# Patient Record
Sex: FEMALE | Race: WHITE | HISPANIC OR LATINO | URBAN - METROPOLITAN AREA
[De-identification: names, ages, dates, MRNs, and addresses within clinical notes are randomized per-mention and may not be internally consistent; named-entity substitution may affect disease eponyms.]

---

## 2022-04-19 ENCOUNTER — INPATIENT HOSPITAL (AMBULATORY)
Dept: URBAN - METROPOLITAN AREA HOSPITAL 76 | Facility: HOSPITAL | Age: 22
End: 2022-04-19
Payer: COMMERCIAL

## 2022-04-19 DIAGNOSIS — K22.3 PERFORATION OF ESOPHAGUS: ICD-10-CM

## 2022-04-19 DIAGNOSIS — R07.89 OTHER CHEST PAIN: ICD-10-CM

## 2022-04-19 DIAGNOSIS — R11.2 NAUSEA WITH VOMITING, UNSPECIFIED: ICD-10-CM

## 2022-04-19 PROCEDURE — 99223 1ST HOSP IP/OBS HIGH 75: CPT | Performed by: INTERNAL MEDICINE

## 2022-04-20 ENCOUNTER — INPATIENT HOSPITAL (AMBULATORY)
Dept: URBAN - METROPOLITAN AREA HOSPITAL 76 | Facility: HOSPITAL | Age: 22
End: 2022-04-20
Payer: COMMERCIAL

## 2022-04-20 DIAGNOSIS — R93.3 ABNORMAL FINDINGS ON DIAGNOSTIC IMAGING OF OTHER PARTS OF DI: ICD-10-CM

## 2022-04-20 DIAGNOSIS — D72.829 ELEVATED WHITE BLOOD CELL COUNT, UNSPECIFIED: ICD-10-CM

## 2022-04-20 DIAGNOSIS — K22.3 PERFORATION OF ESOPHAGUS: ICD-10-CM

## 2022-04-20 DIAGNOSIS — R11.2 NAUSEA WITH VOMITING, UNSPECIFIED: ICD-10-CM

## 2022-04-20 DIAGNOSIS — F12.90 CANNABIS USE, UNSPECIFIED, UNCOMPLICATED: ICD-10-CM

## 2022-04-20 PROCEDURE — 99232 SBSQ HOSP IP/OBS MODERATE 35: CPT | Performed by: NURSE PRACTITIONER

## 2022-04-21 PROCEDURE — 99232 SBSQ HOSP IP/OBS MODERATE 35: CPT | Performed by: NURSE PRACTITIONER

## 2022-04-22 ENCOUNTER — INPATIENT HOSPITAL (AMBULATORY)
Dept: URBAN - METROPOLITAN AREA HOSPITAL 76 | Facility: HOSPITAL | Age: 22
End: 2022-04-22
Payer: COMMERCIAL

## 2022-04-22 DIAGNOSIS — R93.3 ABNORMAL FINDINGS ON DIAGNOSTIC IMAGING OF OTHER PARTS OF DI: ICD-10-CM

## 2022-04-22 DIAGNOSIS — F12.90 CANNABIS USE, UNSPECIFIED, UNCOMPLICATED: ICD-10-CM

## 2022-04-22 DIAGNOSIS — K22.3 PERFORATION OF ESOPHAGUS: ICD-10-CM

## 2022-04-22 DIAGNOSIS — R11.2 NAUSEA WITH VOMITING, UNSPECIFIED: ICD-10-CM

## 2022-04-22 PROCEDURE — 99231 SBSQ HOSP IP/OBS SF/LOW 25: CPT | Performed by: NURSE PRACTITIONER

## 2022-06-10 ENCOUNTER — OFFICE (AMBULATORY)
Dept: URBAN - METROPOLITAN AREA CLINIC 64 | Facility: CLINIC | Age: 22
End: 2022-06-10
Payer: COMMERCIAL

## 2022-06-10 VITALS
WEIGHT: 121 LBS | DIASTOLIC BLOOD PRESSURE: 82 MMHG | SYSTOLIC BLOOD PRESSURE: 117 MMHG | HEIGHT: 62 IN | HEART RATE: 84 BPM

## 2022-06-10 DIAGNOSIS — R11.2 NAUSEA WITH VOMITING, UNSPECIFIED: ICD-10-CM

## 2022-06-10 PROCEDURE — 99214 OFFICE O/P EST MOD 30 MIN: CPT | Performed by: INTERNAL MEDICINE

## 2022-06-10 RX ORDER — FAMOTIDINE 40 MG/1
40 TABLET, FILM COATED ORAL
Qty: 30 | Refills: 3 | Status: ACTIVE
Start: 2022-06-10

## 2022-06-13 ENCOUNTER — OFFICE (AMBULATORY)
Dept: URBAN - METROPOLITAN AREA PATHOLOGY 4 | Facility: PATHOLOGY | Age: 22
End: 2022-06-13
Payer: COMMERCIAL

## 2022-06-13 ENCOUNTER — ON CAMPUS - OUTPATIENT (AMBULATORY)
Dept: URBAN - METROPOLITAN AREA HOSPITAL 2 | Facility: HOSPITAL | Age: 22
End: 2022-06-13
Payer: COMMERCIAL

## 2022-06-13 VITALS
SYSTOLIC BLOOD PRESSURE: 130 MMHG | HEART RATE: 98 BPM | HEART RATE: 99 BPM | HEART RATE: 97 BPM | SYSTOLIC BLOOD PRESSURE: 113 MMHG | DIASTOLIC BLOOD PRESSURE: 74 MMHG | OXYGEN SATURATION: 99 % | DIASTOLIC BLOOD PRESSURE: 69 MMHG | DIASTOLIC BLOOD PRESSURE: 68 MMHG | SYSTOLIC BLOOD PRESSURE: 106 MMHG | HEIGHT: 62 IN | WEIGHT: 121 LBS | HEART RATE: 104 BPM | SYSTOLIC BLOOD PRESSURE: 120 MMHG | DIASTOLIC BLOOD PRESSURE: 61 MMHG | OXYGEN SATURATION: 100 % | RESPIRATION RATE: 16 BRPM | HEART RATE: 88 BPM | OXYGEN SATURATION: 95 % | TEMPERATURE: 98.4 F | DIASTOLIC BLOOD PRESSURE: 89 MMHG | SYSTOLIC BLOOD PRESSURE: 100 MMHG

## 2022-06-13 DIAGNOSIS — K22.89 OTHER SPECIFIED DISEASE OF ESOPHAGUS: ICD-10-CM

## 2022-06-13 DIAGNOSIS — K21.00 GASTRO-ESOPHAGEAL REFLUX DISEASE WITH ESOPHAGITIS, WITHOUT B: ICD-10-CM

## 2022-06-13 DIAGNOSIS — R11.2 NAUSEA WITH VOMITING, UNSPECIFIED: ICD-10-CM

## 2022-06-13 DIAGNOSIS — K31.89 OTHER DISEASES OF STOMACH AND DUODENUM: ICD-10-CM

## 2022-06-13 DIAGNOSIS — R12 HEARTBURN: ICD-10-CM

## 2022-06-13 DIAGNOSIS — K44.9 DIAPHRAGMATIC HERNIA WITHOUT OBSTRUCTION OR GANGRENE: ICD-10-CM

## 2022-06-13 DIAGNOSIS — S11.21XA: ICD-10-CM

## 2022-06-13 DIAGNOSIS — R07.89 OTHER CHEST PAIN: ICD-10-CM

## 2022-06-13 LAB
GI HISTOLOGY: A. UNSPECIFIED: (no result)
GI HISTOLOGY: B. SELECT: (no result)
GI HISTOLOGY: C. UNSPECIFIED: (no result)
GI HISTOLOGY: D. SELECT: (no result)
GI HISTOLOGY: PDF REPORT: (no result)

## 2022-06-13 PROCEDURE — 43239 EGD BIOPSY SINGLE/MULTIPLE: CPT | Performed by: INTERNAL MEDICINE

## 2022-06-13 PROCEDURE — 88305 TISSUE EXAM BY PATHOLOGIST: CPT | Performed by: INTERNAL MEDICINE

## 2022-08-24 ENCOUNTER — OFFICE (AMBULATORY)
Dept: URBAN - METROPOLITAN AREA CLINIC 64 | Facility: CLINIC | Age: 22
End: 2022-08-24
Payer: COMMERCIAL

## 2022-08-24 VITALS
HEIGHT: 62 IN | HEART RATE: 86 BPM | SYSTOLIC BLOOD PRESSURE: 114 MMHG | DIASTOLIC BLOOD PRESSURE: 71 MMHG | WEIGHT: 134 LBS

## 2022-08-24 DIAGNOSIS — R11.2 NAUSEA WITH VOMITING, UNSPECIFIED: ICD-10-CM

## 2022-08-24 PROCEDURE — 99213 OFFICE O/P EST LOW 20 MIN: CPT | Performed by: INTERNAL MEDICINE

## 2023-09-16 ENCOUNTER — HOSPITAL ENCOUNTER (OUTPATIENT)
Facility: HOSPITAL | Age: 23
Discharge: HOME | End: 2023-09-16
Attending: EMERGENCY MEDICINE | Admitting: EMERGENCY MEDICINE
Payer: MEDICAID

## 2023-09-16 ENCOUNTER — APPOINTMENT (OUTPATIENT)
Dept: GENERAL RADIOLOGY | Facility: HOSPITAL | Age: 23
End: 2023-09-16
Payer: MEDICAID

## 2023-09-16 VITALS
HEART RATE: 81 BPM | HEIGHT: 63 IN | RESPIRATION RATE: 18 BRPM | TEMPERATURE: 98.7 F | DIASTOLIC BLOOD PRESSURE: 72 MMHG | SYSTOLIC BLOOD PRESSURE: 109 MMHG | WEIGHT: 130 LBS | BODY MASS INDEX: 23.04 KG/M2 | OXYGEN SATURATION: 98 %

## 2023-09-16 DIAGNOSIS — M54.9 MUSCULOSKELETAL BACK PAIN: Primary | ICD-10-CM

## 2023-09-16 LAB
B-HCG UR QL: NEGATIVE
BILIRUB UR QL STRIP: NEGATIVE
CLARITY UR: CLEAR
COLOR UR: YELLOW
GLUCOSE UR STRIP-MCNC: NEGATIVE MG/DL
HGB UR QL STRIP.AUTO: ABNORMAL
KETONES UR QL STRIP: ABNORMAL
LEUKOCYTE ESTERASE UR QL STRIP.AUTO: ABNORMAL
NITRITE UR QL STRIP: NEGATIVE
PH UR STRIP.AUTO: 5.5 [PH] (ref 5–8)
PROT UR QL STRIP: NEGATIVE
SP GR UR STRIP: >=1.03 (ref 1–1.03)
UROBILINOGEN UR QL STRIP: ABNORMAL

## 2023-09-16 PROCEDURE — 25010000002 KETOROLAC TROMETHAMINE PER 15 MG

## 2023-09-16 PROCEDURE — 81003 URINALYSIS AUTO W/O SCOPE: CPT

## 2023-09-16 PROCEDURE — 93005 ELECTROCARDIOGRAM TRACING: CPT

## 2023-09-16 PROCEDURE — G0463 HOSPITAL OUTPT CLINIC VISIT: HCPCS

## 2023-09-16 PROCEDURE — 71045 X-RAY EXAM CHEST 1 VIEW: CPT

## 2023-09-16 PROCEDURE — 81025 URINE PREGNANCY TEST: CPT

## 2023-09-16 RX ORDER — METHYLPREDNISOLONE 4 MG/1
TABLET ORAL
Qty: 21 TABLET | Refills: 0 | Status: SHIPPED | OUTPATIENT
Start: 2023-09-16

## 2023-09-16 RX ORDER — LIDOCAINE HYDROCHLORIDE 10 MG/ML
5 INJECTION, SOLUTION EPIDURAL; INFILTRATION; INTRACAUDAL; PERINEURAL ONCE
Status: DISCONTINUED | OUTPATIENT
Start: 2023-09-16 | End: 2023-09-16

## 2023-09-16 RX ORDER — KETOROLAC TROMETHAMINE 30 MG/ML
30 INJECTION, SOLUTION INTRAMUSCULAR; INTRAVENOUS ONCE
Status: COMPLETED | OUTPATIENT
Start: 2023-09-16 | End: 2023-09-16

## 2023-09-16 RX ADMIN — KETOROLAC TROMETHAMINE 30 MG: 30 INJECTION, SOLUTION INTRAMUSCULAR; INTRAVENOUS at 14:59

## 2023-09-16 NOTE — DISCHARGE INSTRUCTIONS
Take steroid pack as prescribed to completion.  Alternate Tylenol and ibuprofen as needed for pain.  Return to emergency department for worsening symptoms or other medical emergencies.  Recommended follow-up with PCP.  Refer to the attached instructions for further information.

## 2023-09-16 NOTE — FSED PROVIDER NOTE
Subjective   History of Present Illness  Patient 23-year-old female who presents emergency department for back pain.  Patient recently diagnosed with COVID x6 days ago.  Reports continued cough, congestion, body aches.  Went back to work today, and had sharp back pain in her mid upper back.  Worse with deep breath.  Denies shortness of breath, chest pain.  Currently taking antibiotics for BV.      Review of Systems   Constitutional:  Positive for fatigue. Negative for chills and fever.   HENT:  Positive for congestion and rhinorrhea. Negative for sinus pressure, sinus pain, sore throat and trouble swallowing.    Respiratory:  Positive for cough. Negative for shortness of breath and wheezing.    Cardiovascular:  Negative for chest pain.   Gastrointestinal:  Negative for abdominal pain.   Genitourinary:  Negative for decreased urine volume, difficulty urinating, dysuria, flank pain and frequency.   Musculoskeletal:  Positive for back pain.   Skin:  Negative for color change, pallor, rash and wound.     Past Medical History:   Diagnosis Date    Depression        No Known Allergies    History reviewed. No pertinent surgical history.    History reviewed. No pertinent family history.    Social History     Socioeconomic History    Marital status: Single   Tobacco Use    Smoking status: Never   Substance and Sexual Activity    Alcohol use: Never    Drug use: Yes     Types: Marijuana           Objective   Physical Exam  Constitutional:       General: She is not in acute distress.     Appearance: She is normal weight. She is ill-appearing. She is not toxic-appearing.      Comments: Patient lying down resting.   HENT:      Nose: Congestion present.      Mouth/Throat:      Mouth: Mucous membranes are moist.      Pharynx: Oropharynx is clear. No oropharyngeal exudate.   Cardiovascular:      Rate and Rhythm: Normal rate and regular rhythm.   Pulmonary:      Effort: Pulmonary effort is normal. No respiratory distress.      Breath  sounds: Normal breath sounds. No wheezing.   Abdominal:      General: Abdomen is flat. There is no distension.      Palpations: Abdomen is soft.      Tenderness: There is no abdominal tenderness. There is no right CVA tenderness, left CVA tenderness or guarding.   Musculoskeletal:         General: Normal range of motion.      Comments: Upper thoracic pain mildly tender to palpation bilaterally.   Skin:     General: Skin is warm and dry.   Neurological:      Mental Status: She is alert.   Psychiatric:         Mood and Affect: Mood normal.         Behavior: Behavior normal.       Procedures           ED Course  ED Course as of 09/16/23 1611   Sat Sep 16, 2023   1405 EKG normal sinus rhythm.  Early repolarization.  No evidence of ischemic changes. [AS]   1420 Negative chest x-ray. [AS]   1438 Leukocytes, UA(!): Trace [AS]   1438 Blood, UA(!): Large (3+) [AS]   1438 Ketones, UA(!): Trace [AS]   1454 HCG, Urine QL: Negative [AS]      ED Course User Index  [AS] Ruth Christopher, VALERIE                                           Medical Decision Making  Patient is a 23-year-old female who presents to emergency department for back pain onset to the day as well as continued upper respiratory symptoms from COVID-19 infection.  Patient out of quarantine today, and went to work before experiencing a sharp mid upper back pain.  Patient appears ill, but no acute distress.  Nontoxic.  Vital signs WNL.  Exam is largely unremarkable and nonconcerning.  Low to no suspicion for cardiopulmonary or kidney etiology based on history and exam.  EKG unremarkable.  Chest x-ray negative.  Urinalysis negative.  More likely musculoskeletal strain from frequent coughing and/or going back to work today.  Will prescribe Medrol Dosepak.  Discussed when to return to the emergency department.  Answered all questions.  Patient verbalized understanding and was agreeable to plan and discharge.    My differential diagnosis for back pain includes but is not  limited to: Strain, contusion, fracture, pneumonia, referred pain, pleurisy, myositis, costochondritis, MI, angina, aortic dissection, pericarditis, PE, pneumothorax    Amount and/or Complexity of Data Reviewed  Radiology: ordered.  ECG/medicine tests: ordered.    Risk  Prescription drug management.        Final diagnoses:   Musculoskeletal back pain       ED Disposition  ED Disposition       ED Disposition   Discharge    Condition   Stable    Comment   --               Christiano Jorgensen MD  [address]  [facility] [address]  [place] [address] [phone]    Schedule an appointment as soon as possible for a visit            Medication List        New Prescriptions      methylPREDNISolone 4 MG dose pack  Commonly known as: MEDROL  6 tabs PO x1 day, 5 tabs PO x1 day, and continue decrease of 1 tablet/day.  6 days total treatment.               Where to Get Your Medications        These medications were sent to [id number] - [place], [place] - [address] AT [address] [place] - [phone] [facility] [phone] [address]  [address] [place] [address]      Phone: [phone]   methylPREDNISolone 4 MG dose pack

## 2023-09-17 LAB
QT INTERVAL: 386 MS
QTC INTERVAL: 430 MS

## 2023-11-02 ENCOUNTER — APPOINTMENT (OUTPATIENT)
Dept: CT IMAGING | Facility: HOSPITAL | Age: 23
End: 2023-11-02
Payer: MEDICAID

## 2023-11-02 ENCOUNTER — HOSPITAL ENCOUNTER (EMERGENCY)
Facility: HOSPITAL | Age: 23
Discharge: HOME | End: 2023-11-02
Attending: STUDENT IN AN ORGANIZED HEALTH CARE EDUCATION/TRAINING PROGRAM
Payer: MEDICAID

## 2023-11-02 VITALS
OXYGEN SATURATION: 98 % | HEART RATE: 88 BPM | BODY MASS INDEX: 22.13 KG/M2 | WEIGHT: 124.9 LBS | DIASTOLIC BLOOD PRESSURE: 68 MMHG | HEIGHT: 63 IN | RESPIRATION RATE: 16 BRPM | TEMPERATURE: 98 F | SYSTOLIC BLOOD PRESSURE: 105 MMHG

## 2023-11-02 DIAGNOSIS — R10.31 RIGHT LOWER QUADRANT PAIN: Primary | ICD-10-CM

## 2023-11-02 LAB
ALBUMIN SERPL-MCNC: 4.7 G/DL (ref 3.5–5.2)
ALBUMIN/GLOB SERPL: 2.6 G/DL
ALP SERPL-CCNC: 45 U/L (ref 39–117)
ALT SERPL W P-5'-P-CCNC: 17 U/L (ref 1–33)
ANION GAP SERPL CALCULATED.3IONS-SCNC: 6.5 MMOL/L (ref 5–15)
AST SERPL-CCNC: 15 U/L (ref 1–32)
B-HCG UR QL: NEGATIVE
BACTERIA UR QL AUTO: ABNORMAL /HPF
BASOPHILS # BLD AUTO: 0.02 10*3/MM3 (ref 0–0.2)
BASOPHILS NFR BLD AUTO: 0.5 % (ref 0–1.5)
BILIRUB SERPL-MCNC: 0.4 MG/DL (ref 0–1.2)
BILIRUB UR QL STRIP: NEGATIVE
BUN SERPL-MCNC: 16 MG/DL (ref 6–20)
BUN/CREAT SERPL: 19.3 (ref 7–25)
CALCIUM SPEC-SCNC: 9.6 MG/DL (ref 8.6–10.5)
CHLORIDE SERPL-SCNC: 102 MMOL/L (ref 98–107)
CLARITY UR: ABNORMAL
CO2 SERPL-SCNC: 28.5 MMOL/L (ref 22–29)
COLOR UR: ABNORMAL
CREAT SERPL-MCNC: 0.83 MG/DL (ref 0.57–1)
DEPRECATED RDW RBC AUTO: 37.9 FL (ref 37–54)
EGFRCR SERPLBLD CKD-EPI 2021: 100.5 ML/MIN/1.73
EOSINOPHIL # BLD AUTO: 0.04 10*3/MM3 (ref 0–0.4)
EOSINOPHIL NFR BLD AUTO: 1 % (ref 0.3–6.2)
ERYTHROCYTE [DISTWIDTH] IN BLOOD BY AUTOMATED COUNT: 11.3 % (ref 12.3–15.4)
GLOBULIN UR ELPH-MCNC: 1.8 GM/DL
GLUCOSE SERPL-MCNC: 84 MG/DL (ref 65–99)
GLUCOSE UR STRIP-MCNC: NEGATIVE MG/DL
HCT VFR BLD AUTO: 40.8 % (ref 34–46.6)
HGB BLD-MCNC: 13.7 G/DL (ref 12–15.9)
HGB UR QL STRIP.AUTO: ABNORMAL
HYALINE CASTS UR QL AUTO: ABNORMAL /LPF
IMM GRANULOCYTES # BLD AUTO: 0 10*3/MM3 (ref 0–0.05)
IMM GRANULOCYTES NFR BLD AUTO: 0 % (ref 0–0.5)
KETONES UR QL STRIP: ABNORMAL
LEUKOCYTE ESTERASE UR QL STRIP.AUTO: NEGATIVE
LIPASE SERPL-CCNC: 16 U/L (ref 13–60)
LYMPHOCYTES # BLD AUTO: 1.05 10*3/MM3 (ref 0.7–3.1)
LYMPHOCYTES NFR BLD AUTO: 26.5 % (ref 19.6–45.3)
MCH RBC QN AUTO: 30.3 PG (ref 26.6–33)
MCHC RBC AUTO-ENTMCNC: 33.6 G/DL (ref 31.5–35.7)
MCV RBC AUTO: 90.3 FL (ref 79–97)
MONOCYTES # BLD AUTO: 0.23 10*3/MM3 (ref 0.1–0.9)
MONOCYTES NFR BLD AUTO: 5.8 % (ref 5–12)
NEUTROPHILS NFR BLD AUTO: 2.62 10*3/MM3 (ref 1.7–7)
NEUTROPHILS NFR BLD AUTO: 66.2 % (ref 42.7–76)
NITRITE UR QL STRIP: NEGATIVE
PH UR STRIP.AUTO: 5.5 [PH] (ref 5–8)
PLATELET # BLD AUTO: 286 10*3/MM3 (ref 140–450)
PMV BLD AUTO: 9.5 FL (ref 6–12)
POTASSIUM SERPL-SCNC: 3.9 MMOL/L (ref 3.5–5.2)
PROT SERPL-MCNC: 6.5 G/DL (ref 6–8.5)
PROT UR QL STRIP: ABNORMAL
RBC # BLD AUTO: 4.52 10*6/MM3 (ref 3.77–5.28)
RBC # UR STRIP: ABNORMAL /HPF
REF LAB TEST METHOD: ABNORMAL
SODIUM SERPL-SCNC: 137 MMOL/L (ref 136–145)
SP GR UR STRIP: >=1.03 (ref 1–1.03)
SQUAMOUS #/AREA URNS HPF: ABNORMAL /HPF
UROBILINOGEN UR QL STRIP: ABNORMAL
WBC # UR STRIP: ABNORMAL /HPF
WBC NRBC COR # BLD: 3.96 10*3/MM3 (ref 3.4–10.8)

## 2023-11-02 PROCEDURE — 83690 ASSAY OF LIPASE: CPT

## 2023-11-02 PROCEDURE — 80053 COMPREHEN METABOLIC PANEL: CPT

## 2023-11-02 PROCEDURE — 25510000001 IOPAMIDOL PER 1 ML: Performed by: STUDENT IN AN ORGANIZED HEALTH CARE EDUCATION/TRAINING PROGRAM

## 2023-11-02 PROCEDURE — 25810000003 SODIUM CHLORIDE 0.9 % SOLUTION

## 2023-11-02 PROCEDURE — 87086 URINE CULTURE/COLONY COUNT: CPT

## 2023-11-02 PROCEDURE — 81001 URINALYSIS AUTO W/SCOPE: CPT

## 2023-11-02 PROCEDURE — 85025 COMPLETE CBC W/AUTO DIFF WBC: CPT

## 2023-11-02 PROCEDURE — 25010000002 ONDANSETRON PER 1 MG

## 2023-11-02 PROCEDURE — 74177 CT ABD & PELVIS W/CONTRAST: CPT

## 2023-11-02 PROCEDURE — 96374 THER/PROPH/DIAG INJ IV PUSH: CPT

## 2023-11-02 PROCEDURE — 81025 URINE PREGNANCY TEST: CPT | Performed by: STUDENT IN AN ORGANIZED HEALTH CARE EDUCATION/TRAINING PROGRAM

## 2023-11-02 PROCEDURE — 99285 EMERGENCY DEPT VISIT HI MDM: CPT

## 2023-11-02 PROCEDURE — 99284 EMERGENCY DEPT VISIT MOD MDM: CPT

## 2023-11-02 PROCEDURE — 96361 HYDRATE IV INFUSION ADD-ON: CPT

## 2023-11-02 RX ORDER — ONDANSETRON 2 MG/ML
4 INJECTION INTRAMUSCULAR; INTRAVENOUS ONCE
Status: COMPLETED | OUTPATIENT
Start: 2023-11-02 | End: 2023-11-02

## 2023-11-02 RX ORDER — SODIUM CHLORIDE 0.9 % (FLUSH) 0.9 %
10 SYRINGE (ML) INJECTION AS NEEDED
Status: DISCONTINUED | OUTPATIENT
Start: 2023-11-02 | End: 2023-11-02 | Stop reason: HOSPADM

## 2023-11-02 RX ORDER — KETOROLAC TROMETHAMINE 15 MG/ML
15 INJECTION, SOLUTION INTRAMUSCULAR; INTRAVENOUS ONCE
Status: DISCONTINUED | OUTPATIENT
Start: 2023-11-02 | End: 2023-11-02

## 2023-11-02 RX ORDER — ONDANSETRON 4 MG/1
4 TABLET, ORALLY DISINTEGRATING ORAL EVERY 8 HOURS PRN
Qty: 12 TABLET | Refills: 0 | Status: SHIPPED | OUTPATIENT
Start: 2023-11-02

## 2023-11-02 RX ADMIN — SODIUM CHLORIDE 1000 ML: 9 INJECTION, SOLUTION INTRAVENOUS at 12:37

## 2023-11-02 RX ADMIN — ONDANSETRON 4 MG: 2 INJECTION INTRAMUSCULAR; INTRAVENOUS at 12:38

## 2023-11-02 RX ADMIN — IOPAMIDOL 100 ML: 755 INJECTION, SOLUTION INTRAVENOUS at 13:28

## 2023-11-02 NOTE — FSED PROVIDER NOTE
[facility]    EMERGENCY DEPARTMENT ENCOUNTER    Room Number:  04/04  Date seen:  11/2/2023  Time seen: 12:29 EDT  PCP: Christiano Jorgensen MD  Historian: Patient    HPI:  Chief complaint: Abdominal pain  Context:Jayne Lagunas is a 23 y.o. female who presents to the ED with c/o abdominal pain.  Patient reports that she has been having right lower quadrant abdominal pain for the last 3 days.  She also reports that she has been having some nausea but denies any vomiting or diarrhea.  Patient denies any radiation of the pain.  She does report that she has some pain with urination.  Patient denies any fever.  Reports she has been eating and drinking without difficulty.  She denies any history of surgeries on her abdomen.  She does report that she has a history of PCOS and was recently started on metformin a couple months ago.    Timing: Constant  Duration: 3 days  Location: Right lower quadrant  Radiation: Nonradiating  Quality: Aching  Intensity/Severity: Mild to moderate  Associated Symptoms: Right lower quadrant pain, nausea  Aggravating Factors: Palpation  Alleviating Factors: No known alleviating      MEDICAL RECORD REVIEW  Depression    ALLERGIES  Patient has no known allergies.    PAST MEDICAL HISTORY  Active Ambulatory Problems     Diagnosis Date Noted    No Active Ambulatory Problems     Resolved Ambulatory Problems     Diagnosis Date Noted    No Resolved Ambulatory Problems     Past Medical History:   Diagnosis Date    Depression        PAST SURGICAL HISTORY  History reviewed. No pertinent surgical history.    FAMILY HISTORY  History reviewed. No pertinent family history.    SOCIAL HISTORY  Social History     Socioeconomic History    Marital status: Single   Tobacco Use    Smoking status: Never   Substance and Sexual Activity    Alcohol use: Never    Drug use: Yes     Types: Marijuana       REVIEW OF SYSTEMS  Review of Systems    All systems reviewed and  negative except for those discussed in HPI.     PHYSICAL EXAM    I have reviewed the triage vital signs and nursing notes.    ED Triage Vitals [11/02/23 1138]   Temp Heart Rate Resp BP SpO2   97.7 °F (36.5 °C) 90 18 105/67 97 %      Temp src Heart Rate Source Patient Position BP Location FiO2 (%)   Oral Monitor Sitting Left arm --       Physical Exam  Constitutional:       Appearance: She is well-developed. She is not toxic-appearing.   HENT:      Mouth/Throat:      Mouth: Mucous membranes are moist.   Eyes:      Extraocular Movements: Extraocular movements intact.   Cardiovascular:      Rate and Rhythm: Normal rate and regular rhythm.      Heart sounds: Normal heart sounds.   Pulmonary:      Effort: Pulmonary effort is normal.      Breath sounds: Normal breath sounds.   Abdominal:      General: Abdomen is flat. Bowel sounds are normal. There is no distension.      Palpations: Abdomen is soft.      Tenderness: There is abdominal tenderness in the right lower quadrant. There is no guarding or rebound.   Skin:     General: Skin is warm.   Neurological:      General: No focal deficit present.      Mental Status: She is alert.   Psychiatric:         Mood and Affect: Mood normal.         Vital signs and nursing notes reviewed.        LAB RESULTS  Recent Results (from the past 24 hour(s))   Pregnancy, Urine - Urine, Clean Catch    Collection Time: 11/02/23 12:06 PM    Specimen: Urine, Clean Catch   Result Value Ref [place]    HCG, Urine QL Negative Negative   Urinalysis without microscopic (no culture) - Urine, Clean Catch    Collection Time: 11/02/23 12:08 PM    Specimen: Urine, Clean Catch   Result Value Ref [place]    Color, UA Dark Yellow (A) Yellow, Straw    Appearance, UA Cloudy (A) Clear    pH, UA 5.5 5.0 - 8.0    Specific Gravity, UA >=1.030 1.005 - 1.030    Glucose, UA Negative Negative    Ketones, UA Trace (A) Negative    Bilirubin, UA Negative Negative    Blood, UA Trace (A) Negative    Protein, UA 30 mg/dL (1+)  (A) Negative    Leuk Esterase, UA Negative Negative    Nitrite, UA Negative Negative    Urobilinogen, UA 0.2 E.U./dL 0.2 - 1.0 E.U./dL   Comprehensive Metabolic Panel    Collection Time: 11/02/23 12:37 PM    Specimen: Blood   Result Value Ref [place]    Glucose 84 65 - 99 mg/dL    BUN 16 6 - 20 mg/dL    Creatinine 0.83 0.57 - 1.00 mg/dL    Sodium 137 136 - 145 mmol/L    Potassium 3.9 3.5 - 5.2 mmol/L    Chloride 102 98 - 107 mmol/L    CO2 28.5 22.0 - 29.0 mmol/L    Calcium 9.6 8.6 - 10.5 mg/dL    Total Protein 6.5 6.0 - 8.5 g/dL    Albumin 4.7 3.5 - 5.2 g/dL    ALT (SGPT) 17 1 - 33 U/L    AST (SGOT) 15 1 - 32 U/L    Alkaline Phosphatase 45 39 - 117 U/L    Total Bilirubin 0.4 0.0 - 1.2 mg/dL    Globulin 1.8 gm/dL    A/G Ratio 2.6 g/dL    BUN/Creatinine Ratio 19.3 7.0 - 25.0    Anion Gap 6.5 5.0 - 15.0 mmol/L    eGFR 100.5 >60.0 mL/min/1.73   Lipase    Collection Time: 11/02/23 12:37 PM    Specimen: Blood   Result Value Ref [place]    Lipase 16 13 - 60 U/L   CBC Auto Differential    Collection Time: 11/02/23 12:37 PM    Specimen: Blood   Result Value Ref [place]    WBC 3.96 3.40 - 10.80 10*3/mm3    RBC 4.52 3.77 - 5.28 10*6/mm3    Hemoglobin 13.7 12.0 - 15.9 g/dL    Hematocrit 40.8 34.0 - 46.6 %    MCV 90.3 79.0 - 97.0 fL    MCH 30.3 26.6 - 33.0 pg    MCHC 33.6 31.5 - 35.7 g/dL    RDW 11.3 (L) 12.3 - 15.4 %    RDW-SD 37.9 37.0 - 54.0 fl    MPV 9.5 6.0 - 12.0 fL    Platelets 286 140 - 450 10*3/mm3    Neutrophil % 66.2 42.7 - 76.0 %    Lymphocyte % 26.5 19.6 - 45.3 %    Monocyte % 5.8 5.0 - 12.0 %    Eosinophil % 1.0 0.3 - 6.2 %    Basophil % 0.5 0.0 - 1.5 %    Immature Grans % 0.0 0.0 - 0.5 %    Neutrophils, Absolute 2.62 1.70 - 7.00 10*3/mm3    Lymphocytes, Absolute 1.05 0.70 - 3.10 10*3/mm3    Monocytes, Absolute 0.23 0.10 - 0.90 10*3/mm3    Eosinophils, Absolute 0.04 0.00 - 0.40 10*3/mm3    Basophils, Absolute 0.02 0.00 - 0.20 10*3/mm3    Immature Grans, Absolute 0.00 0.00 - 0.05 10*3/mm3       Ordered the above labs and  independently reviewed the results.      RADIOLOGY RESULTS  CT Abdomen Pelvis With Contrast    Result Date: 11/2/2023  CT ABDOMEN PELVIS W CONTRAST Date of Exam: 11/2/2023 1:20 PM EDT Indication: RLQ pain, nausea. Comparison: None available. Technique: Axial CT images were obtained of the abdomen and pelvis following the uneventful intravenous administration of iodinated contrast. Sagittal and coronal reconstructions were performed.  Automated exposure control and iterative reconstruction methods were used. Findings: Lung bases are without consolidation. Tiny hiatal hernia. No pericardial or pleural effusion. The liver and spleen are normal in size and contour. No radiodense gallstone. Normal adrenal glands. Pancreas without findings of pancreatitis. No biliary dilatation. Kidneys are symmetrically enhancing. No hydronephrosis or hydroureter. No perinephric fluid collection. No ureteral calculus. Urinary bladder is without acute abnormality. Uterus is retroverted. The ovaries are enlarged with peripherally oriented follicles most consistent with polycystic ovarian syndrome. No suspicious adnexal mass. No drainable fluid collection. Negative for pneumoperitoneum. No bowel obstruction. Normal appendix. Moderate amount of stool throughout the colon. Stomach and proximal small bowel normally configured. The portal vein, splenic vein, and superior mesenteric vein are patent. Abdominal aorta and branch vasculature patent. Variant anatomy with duplicated infrarenal IVC. No aggressive osseous lesion or acute fracture.     Impression: 1. No acute abnormality in the abdomen or pelvis. 2. Normal appendix. 3. Enlarged ovaries with numerous peripheral follicles most consistent with polycystic ovarian syndrome. Electronically Signed: Sigifredo Waterman MD  11/2/2023 1:43 PM EDT  Workstation ID: [id number]        I ordered the above noted radiological studies. Independently reviewed by me and discussed with radiologist.  See dictation  above for official radiology interpretation.      Orders placed during this visit:  Orders Placed This Encounter   Procedures    CT Abdomen Pelvis With Contrast    Pregnancy, Urine - Urine, Clean Catch    Urinalysis without microscopic (no culture) - Urine, Clean Catch    Comprehensive Metabolic Panel    Lipase    CBC Auto Differential    Urinalysis With Culture If Indicated - Urine, Clean Catch    Urinalysis, Microscopic Only - Urine, Clean Catch    NPO Diet NPO Type: Strict NPO    Undress & Gown    Insert Peripheral IV    CBC & Differential    ED Acknowledgement Form Needed;           PROCEDURES    Procedures        MEDICATIONS GIVEN IN ER    Medications   sodium chloride 0.9 % flush 10 mL (has no administration in time range)   ketorolac (TORADOL) injection 15 mg (has no administration in time range)   sodium chloride 0.9 % bolus 1,000 mL (0 mL Intravenous Stopped 11/2/23 1351)   ondansetron (ZOFRAN) injection 4 mg (4 mg Intravenous Given 11/2/23 1238)   iopamidol (ISOVUE-370) 76 % injection 100 mL (100 mL Intravenous Given 11/2/23 1328)         PROGRESS, DATA ANALYSIS, CONSULTS, AND MEDICAL DECISION MAKING    All labs have been independently reviewed by me.  All radiology studies have been reviewed by me.   EKG's independently reviewed by me.  Discussion below represents my analysis of pertinent findings related to patient's condition, differential diagnosis, treatment plan and final disposition.    I rechecked the patient.  I discussed the patient's labs, radiology findings (including all incidental findings), diagnosis, and plan for discharge.  A repeat exam reveals no new worrisome changes from my initial exam findings.  The patient understands that the fact that they are being discharged does not denote that nothing is abnormal, it indicates that no clinical emergency is present and that they must follow-up as directed in order to properly maintain their health.  Follow-up instructions (specifically listed  below) and return to ER precautions were given at this time.  I specifically instructed the patient to follow-up with their PCP.  The patient understands and agrees with the plan, and is ready for discharge.  All questions answered.    ED Course as of 11/02/23 1421   Thu Nov 02, 2023   1409 CT Abdomen Pelvis With Contrast  Impression:  1. No acute abnormality in the abdomen or pelvis.  2. Normal appendix.  3. Enlarged ovaries with numerous peripheral follicles most consistent with polycystic ovarian syndrome.  Electronically Signed: Sigifredo Waterman MD    11/2/2023 1:43 PM EDT  [KJ]      ED Course User Index  [KJ] Carlie Blakely APRN       AS OF 14:21 EDT VITALS:    BP - 105/68  HR - 88  TEMP - 98 °F (36.7 °C) (Temporal)  02 SATS - 98%    Medical Decision Making  MEDICAL DECISION  Radiology interpretation:  Images reviewed and interpreted radiology images , no acute findings.  Enlarged ovaries with numerous follicles consistent with PCOS    This is a 23-year-old with RLQ pain, most concerning for appendicitis. Abdominal exam without peritoneal signs. No evidence of acute abdomen at this time, low suspicion for appendicitis given negative CT scan. Given work up, low suspicion for acute hepatobiliary disease (including acute cholecystitis or cholangitis), acute infectious processes (pneumonia, hepatitis, pyelonephritis), vascular catastrophe, bowel obstruction, or viscus perforation. Presentation not consistent with other acute, emergent causes of abdominal pain at this time.  Patient had an IV established and blood work was obtained.  There is no acute findings.  Patient was given IV fluids and Toradol.  She was instructed to follow-up with her primary care provider and was given Zofran at discharge.  Patient was given strict return precautions with understanding.    Problems Addressed:  Right lower quadrant pain: complicated acute illness or injury    Amount and/or Complexity of Data Reviewed  Labs:  ordered.  Radiology: ordered. Decision-making details documented in ED Course.    Risk  Prescription drug management.          DIAGNOSIS  Final diagnoses:   Right lower quadrant pain       New Medications Ordered This Visit   Medications    sodium chloride 0.9 % flush 10 mL    sodium chloride 0.9 % bolus 1,000 mL    ondansetron (ZOFRAN) injection 4 mg    iopamidol (ISOVUE-370) 76 % injection 100 mL    ketorolac (TORADOL) injection 15 mg    ondansetron ODT (ZOFRAN-ODT) 4 MG disintegrating tablet     Sig: Place 1 tablet on the tongue Every 8 (Eight) Hours As Needed for Nausea or Vomiting.     Dispense:  12 tablet     Refill:  0           I performed hand hygiene on entry into the pt room and upon exit.     Note Disclaimer: At [facility], we believe that sharing information builds trust and better relationships. You are receiving this note because you recently visited [facility]. It is possible you will see health information before a provider has talked with you about it. This kind of information can be easy to misunderstand. To help you fully understand what it means for your health, we urge you to discuss this note with your provider.         Part of this note may be an electronic transcription/translation of spoken language to printed text using the Dragon Dictation System.     Appropriate PPE worn during exam.    Dictated utilizing Dragon dictation     Note Disclaimer: At [facility], we believe that sharing information builds trust and better relationships. You are receiving this note because you recently visited [facility]. It is possible you will see health information before a provider has talked with you about it. This kind of information can be easy to misunderstand. To help you fully understand what it means for your health, we urge you to discuss this note with your provider.

## 2023-11-02 NOTE — DISCHARGE INSTRUCTIONS
Thank you for letting us care for you today.  Continue your home medications as prescribed.  You can use the Zofran as needed for nausea.  Please follow-up with your primary care provider as previously discussed.  Immediately return to the emergency room for any worsening abdominal pain, persistent vomiting, fever or any other concerning symptoms.

## 2023-11-03 LAB — BACTERIA SPEC AEROBE CULT: NORMAL

## 2023-11-15 ENCOUNTER — HOSPITAL ENCOUNTER (OUTPATIENT)
Facility: HOSPITAL | Age: 23
Discharge: HOME | End: 2023-11-15
Attending: EMERGENCY MEDICINE | Admitting: EMERGENCY MEDICINE
Payer: MEDICAID

## 2023-11-15 VITALS
HEART RATE: 95 BPM | HEIGHT: 63 IN | DIASTOLIC BLOOD PRESSURE: 71 MMHG | TEMPERATURE: 98.6 F | BODY MASS INDEX: 22.15 KG/M2 | WEIGHT: 125 LBS | RESPIRATION RATE: 18 BRPM | SYSTOLIC BLOOD PRESSURE: 101 MMHG | OXYGEN SATURATION: 98 %

## 2023-11-15 DIAGNOSIS — J11.1 INFLUENZA: Primary | ICD-10-CM

## 2023-11-15 DIAGNOSIS — R51.9 NONINTRACTABLE HEADACHE, UNSPECIFIED CHRONICITY PATTERN, UNSPECIFIED HEADACHE TYPE: ICD-10-CM

## 2023-11-15 LAB
FLUAV SUBTYP SPEC NAA+PROBE: NOT DETECTED
FLUBV RNA ISLT QL NAA+PROBE: DETECTED
SARS-COV-2 RNA RESP QL NAA+PROBE: NOT DETECTED
STREP A PCR: NOT DETECTED

## 2023-11-15 PROCEDURE — G0463 HOSPITAL OUTPT CLINIC VISIT: HCPCS

## 2023-11-15 PROCEDURE — 87636 SARSCOV2 & INF A&B AMP PRB: CPT | Performed by: EMERGENCY MEDICINE

## 2023-11-15 PROCEDURE — 25010000002 KETOROLAC TROMETHAMINE PER 15 MG

## 2023-11-15 PROCEDURE — 87651 STREP A DNA AMP PROBE: CPT | Performed by: EMERGENCY MEDICINE

## 2023-11-15 RX ORDER — KETOROLAC TROMETHAMINE 15 MG/ML
15 INJECTION, SOLUTION INTRAMUSCULAR; INTRAVENOUS ONCE
Status: COMPLETED | OUTPATIENT
Start: 2023-11-15 | End: 2023-11-15

## 2023-11-15 RX ORDER — BROMPHENIRAMINE MALEATE, PSEUDOEPHEDRINE HYDROCHLORIDE, AND DEXTROMETHORPHAN HYDROBROMIDE 2; 30; 10 MG/5ML; MG/5ML; MG/5ML
5 SYRUP ORAL 4 TIMES DAILY PRN
Qty: 118 ML | Refills: 0 | Status: SHIPPED | OUTPATIENT
Start: 2023-11-15

## 2023-11-15 RX ADMIN — KETOROLAC TROMETHAMINE 15 MG: 15 INJECTION, SOLUTION INTRAMUSCULAR; INTRAVENOUS at 12:17

## 2023-11-15 NOTE — Clinical Note
[facility] FSED [place] [address] [place] [address] [place] [address]  Phone: [phone]    Jayne Lagunas was seen and treated in our emergency department on 11/15/2023.  She may return to work on 11/17/2023.         Thank you for choosing [facility].    Victor Manuel Montoya MD

## 2023-11-15 NOTE — DISCHARGE INSTRUCTIONS
Increase fluids, rest    Alternate Tylenol and ibuprofen as needed for discomfort  Bromfed cough medicine sent to your pharmacy take as prescribed    Over-the-counter cold and flu medications as needed    Follow-up with primary care    Return to the ED for new or worsening symptoms

## 2023-11-15 NOTE — FSED PROVIDER NOTE
Subjective   History of Present Illness  Chief Complaint: Cough, congestion recent exposure to the flu      HPI: Patient is a 23-year-old female who presents by private vehicle with complaints of cough congestion and headache starting 3 days ago, son recently tested positive for influenza and was diagnosed with bronchiolitis.  She has a history of PCOS and depression.  States the headache is not the worst of her life there was no sudden onset or thunderclap sensation she is having no visual disturbances.  Has been taking old prescription of Tessalon Perles.    PCP: Jameel    History provided by:  Patient      Review of Systems   HENT:  Positive for congestion.    Respiratory:  Positive for cough.    Neurological:  Positive for headaches.       Past Medical History:   Diagnosis Date    Depression        No Known Allergies    No past surgical history on file.    No family history on file.    Social History     Socioeconomic History    Marital status: Single   Tobacco Use    Smoking status: Never   Substance and Sexual Activity    Alcohol use: Never    Drug use: Yes     Types: Marijuana           Objective   Physical Exam  Vitals reviewed.   Constitutional:       General: She is not in acute distress.     Appearance: She is not toxic-appearing.   HENT:      Head: Normocephalic.      Nose: Congestion present.      Mouth/Throat:      Mouth: Mucous membranes are moist.      Pharynx: Oropharynx is clear.   Eyes:      Extraocular Movements: Extraocular movements intact.      Pupils: Pupils are equal, round, and reactive to light.   Cardiovascular:      Pulses: Normal pulses.   Pulmonary:      Effort: Pulmonary effort is normal.      Breath sounds: Normal breath sounds.   Abdominal:      General: Bowel sounds are normal.      Palpations: Abdomen is soft.   Skin:     General: Skin is warm.   Neurological:      General: No focal deficit present.      Mental Status: She is alert and oriented to person, place, and time.  "        Procedures           ED Course  ED Course as of 11/15/23 1212   Wed Nov 15, 2023   1204 Awaiting covid and influenza testing   []      ED Course User Index  [] Ally Orta APRN           /71 (BP Location: Left arm, Patient Position: Sitting)   Pulse 95   Temp 98.6 °F (37 °C) (Oral)   Resp 18   Ht 160 cm (63\")   Wt 56.7 kg (125 lb)   LMP  (Within Weeks)   SpO2 98%   BMI 22.14 kg/m²   Labs Reviewed   COVID-19 AND FLU A/B, NP SWAB IN TRANSPORT MEDIA 1 HR TAT - Abnormal; Notable for the following components:       Result Value    Influenza B PCR Detected (*)     All other components within normal limits    Narrative:     Fact sheet for providers: https://www.fda.gov/media/195868/download    Fact sheet for patients: https://www.fda.gov/media/529567/download    Test performed by PCR.   RAPID STREP A SCREEN - Normal     Medications   ketorolac (TORADOL) injection 15 mg (has no administration in time range)     No radiology results for the last day                                  Medical Decision Making  Patient presented to our facility with complaints of cough congestion and headache recently exposed to influenza, COVID-19 testing was negative influenza B+.  With symptom onset approximately 3 days ago she is out of the window for Tamiflu treatment.  A prescription for Bromfed cough medicine was sent to her preferred pharmacy we discussed over-the-counter cold and flu medications as needed as well as Tylenol and ibuprofen increasing fluids.  Advised to follow-up with PCP next week, we discussed worrisome symptoms to monitor for and when to return the emergency room.  Patient gave verbal understanding was agreeable to plan of care.  She was alert oriented nontoxic with stable vitals at time of discharge, denied further questions or complaints.    Chart review: 9/1/2023 outpatient visit with PCP related to cough      Note Disclaimer: At [facility], we believe that sharing information " builds trust and better  relationships. You are receiving this note because you recently visited [facility]. It is possible you will see health information before a provider has talked with you about it. This kind of information can be easy to misunderstand. To help you fully understand what it means for your health, we urge you to discuss this note with your provider.       Part of this note may be an electronic transcription/translation of spoken language to printed text using the Dragon Dictation System.    Appropriate PPE worn during exam.    Problems Addressed:  Influenza: complicated acute illness or injury    Risk  Prescription drug management.        Final diagnoses:   Influenza   Nonintractable headache, unspecified chronicity pattern, unspecified headache type       ED Disposition  ED Disposition       ED Disposition   Discharge    Condition   Stable    Comment   --               Christiano Jorgensen MD  [address]  [address] [place] [address] [phone]               Medication List        New Prescriptions      brompheniramine-pseudoephedrine-DM 30-2-10 MG/5ML syrup  Take 5 mL by mouth 4 (Four) Times a Day As Needed for Allergies.               Where to Get Your Medications        These medications were sent to [email or web address] #[id number] - [place], [place] - [address] [place] [address] AT [address] [place] [address] & [facility] - [phone]  - [phone] [address], [place] [address]      Phone: [phone]   brompheniramine-pseudoephedrine-DM 30-2-10 MG/5ML syrup

## 2023-12-21 ENCOUNTER — HOSPITAL ENCOUNTER (OUTPATIENT)
Facility: HOSPITAL | Age: 23
Discharge: HOME | End: 2023-12-21
Attending: EMERGENCY MEDICINE | Admitting: EMERGENCY MEDICINE
Payer: MEDICAID

## 2023-12-21 VITALS
HEART RATE: 120 BPM | TEMPERATURE: 97.5 F | BODY MASS INDEX: 22.08 KG/M2 | DIASTOLIC BLOOD PRESSURE: 88 MMHG | RESPIRATION RATE: 18 BRPM | SYSTOLIC BLOOD PRESSURE: 138 MMHG | HEIGHT: 62 IN | OXYGEN SATURATION: 98 % | WEIGHT: 120 LBS

## 2023-12-21 DIAGNOSIS — K52.9 GASTROENTERITIS: Primary | ICD-10-CM

## 2023-12-21 LAB
B-HCG UR QL: NEGATIVE
BILIRUB UR QL STRIP: NEGATIVE
CLARITY UR: ABNORMAL
COLOR UR: ABNORMAL
GLUCOSE UR STRIP-MCNC: NEGATIVE MG/DL
HGB UR QL STRIP.AUTO: NEGATIVE
KETONES UR QL STRIP: ABNORMAL
LEUKOCYTE ESTERASE UR QL STRIP.AUTO: ABNORMAL
NITRITE UR QL STRIP: NEGATIVE
PH UR STRIP.AUTO: 5.5 [PH] (ref 5–8)
PROT UR QL STRIP: NEGATIVE
SP GR UR STRIP: >=1.03 (ref 1–1.03)
UROBILINOGEN UR QL STRIP: ABNORMAL

## 2023-12-21 PROCEDURE — 25010000002 DICYCLOMINE PER 20 MG: Performed by: EMERGENCY MEDICINE

## 2023-12-21 PROCEDURE — 63710000001 ONDANSETRON ODT 4 MG TABLET DISPERSIBLE: Performed by: EMERGENCY MEDICINE

## 2023-12-21 PROCEDURE — 81025 URINE PREGNANCY TEST: CPT

## 2023-12-21 PROCEDURE — G0463 HOSPITAL OUTPT CLINIC VISIT: HCPCS | Performed by: EMERGENCY MEDICINE

## 2023-12-21 PROCEDURE — 81003 URINALYSIS AUTO W/O SCOPE: CPT

## 2023-12-21 RX ORDER — SACCHAROMYCES BOULARDII 250 MG
250 CAPSULE ORAL 2 TIMES DAILY
Qty: 20 CAPSULE | Refills: 0 | Status: SHIPPED | OUTPATIENT
Start: 2023-12-21 | End: 2023-12-31

## 2023-12-21 RX ORDER — ONDANSETRON 4 MG/1
4 TABLET, ORALLY DISINTEGRATING ORAL ONCE
Status: COMPLETED | OUTPATIENT
Start: 2023-12-21 | End: 2023-12-21

## 2023-12-21 RX ORDER — DICYCLOMINE HYDROCHLORIDE 10 MG/ML
20 INJECTION INTRAMUSCULAR ONCE
Status: COMPLETED | OUTPATIENT
Start: 2023-12-21 | End: 2023-12-21

## 2023-12-21 RX ORDER — DICYCLOMINE HCL 20 MG
20 TABLET ORAL EVERY 6 HOURS PRN
Qty: 20 TABLET | Refills: 0 | Status: SHIPPED | OUTPATIENT
Start: 2023-12-21 | End: 2023-12-26

## 2023-12-21 RX ORDER — ONDANSETRON 4 MG/1
4 TABLET, ORALLY DISINTEGRATING ORAL EVERY 8 HOURS PRN
Qty: 15 TABLET | Refills: 0 | Status: SHIPPED | OUTPATIENT
Start: 2023-12-21 | End: 2023-12-26

## 2023-12-21 RX ADMIN — ONDANSETRON 4 MG: 4 TABLET, ORALLY DISINTEGRATING ORAL at 14:58

## 2023-12-21 RX ADMIN — DICYCLOMINE HYDROCHLORIDE 20 MG: 10 INJECTION, SOLUTION INTRAMUSCULAR at 14:58

## 2023-12-21 NOTE — Clinical Note
[facility] FSED [place] [address] [place] [address] [place] [address]  Phone: [phone]    Jayne Lagunas was seen and treated in our emergency department on 12/21/2023.  She may return to work on 12/21/2023.  She may return to work when she is feeling well enough       Thank you for choosing [facility].    Jose Lewis MD

## 2023-12-21 NOTE — FSED PROVIDER NOTE
Subjective   History of Present Illness  23-year-old  female with no past medical history presents emerged department for abdominal cramping, nausea, vomiting.  Symptoms began after eating Mexican food, specifically chicken.  Patient denies any diarrhea, recent travel, sick contacts.  No fever, chills, sweats.  No dark or bloody stools.    Review of Systems   All other systems reviewed and are negative.      Past Medical History:   Diagnosis Date    Depression        No Known Allergies    History reviewed. No pertinent surgical history.    History reviewed. No pertinent family history.    Social History     Socioeconomic History    Marital status: Single   Tobacco Use    Smoking status: Never   Substance and Sexual Activity    Alcohol use: Never    Drug use: Yes     Types: Marijuana           Objective   Physical Exam  Vitals and nursing note reviewed.   Constitutional:       General: She is not in acute distress.     Appearance: Normal appearance. She is normal weight. She is not ill-appearing.   HENT:      Head: Normocephalic and atraumatic.      Right Ear: External ear normal.      Left Ear: External ear normal.      Nose: Nose normal.      Mouth/Throat:      Mouth: Mucous membranes are moist.      Pharynx: Oropharynx is clear.   Eyes:      Conjunctiva/sclera: Conjunctivae normal.      Pupils: Pupils are equal, round, and reactive to light.   Cardiovascular:      Rate and Rhythm: Normal rate.      Pulses: Normal pulses.   Pulmonary:      Effort: Pulmonary effort is normal.   Abdominal:      General: Abdomen is flat. There is no distension.      Palpations: There is no mass.      Tenderness: There is no abdominal tenderness. There is no right CVA tenderness, left CVA tenderness, guarding or rebound.      Hernia: No hernia is present.   Musculoskeletal:         General: Normal range of motion.      Cervical back: Normal range of motion.   Skin:     General: Skin is warm.      Capillary Refill: Capillary  refill takes less than 2 seconds.   Neurological:      General: No focal deficit present.      Mental Status: She is alert.         Procedures           ED Course                                           Medical Decision Making  Nausea, vomiting and a 23-year-old female.  On exam patient afebrile, nontoxic, no distress, no abdominal tenderness.  Suspect foodborne illness.  The plan is to treat with supportive care, Zofran, Bentyl, p.o. challenge.  If patient fails will administer IV fluids and IV medication and reevaluate.  Do not suspect any life-threatening intra-abdominal catastrophe such as appendicitis or any other etiology.    Problems Addressed:  Gastroenteritis: complicated acute illness or injury    Risk  OTC drugs.  Prescription drug management.        Final diagnoses:   Gastroenteritis       ED Disposition  ED Disposition       ED Disposition   Discharge    Condition   Stable    Comment   --               No follow-up provider specified.       Medication List        New Prescriptions      dicyclomine 20 MG tablet  Commonly known as: BENTYL  Take 1 tablet by mouth Every 6 (Six) Hours As Needed for Abdominal Cramping for up to 5 days.     ondansetron ODT 4 MG disintegrating tablet  Commonly known as: ZOFRAN-ODT  Place 1 tablet on the tongue Every 8 (Eight) Hours As Needed for Nausea or Vomiting for up to 5 days.     saccharomyces boulardii 250 MG capsule  Commonly known as: FLORASTOR  Take 1 capsule by mouth 2 (Two) Times a Day for 10 days.               Where to Get Your Medications        [id number] - [place], [place] - [address] [place] [address] AT [address] [place] [address] - [phone]  - [phone] [address] [place] [address] [place] [address]      Phone: [phone]   dicyclomine 20 MG tablet  ondansetron ODT 4 MG disintegrating tablet  saccharomyces boulardii 250 MG capsule

## 2023-12-21 NOTE — Clinical Note
[facility] FSED [place] [id number] [place] [address] [place] [address]  Phone: [phone]    Jayne Lagunas was seen and treated in our emergency department on 12/21/2023.  She may return to work on 12/21/2023.  She may return to work when she is feeling well enough       Thank you for choosing [facility].    Jose Lewis MD

## 2024-03-25 ENCOUNTER — HOSPITAL ENCOUNTER (OUTPATIENT)
Facility: HOSPITAL | Age: 24
Discharge: HOME | End: 2024-03-25
Attending: EMERGENCY MEDICINE | Admitting: EMERGENCY MEDICINE
Payer: MEDICAID

## 2024-03-25 VITALS
TEMPERATURE: 98.2 F | SYSTOLIC BLOOD PRESSURE: 107 MMHG | HEART RATE: 70 BPM | WEIGHT: 125.1 LBS | HEIGHT: 63 IN | RESPIRATION RATE: 17 BRPM | BODY MASS INDEX: 22.16 KG/M2 | DIASTOLIC BLOOD PRESSURE: 70 MMHG | OXYGEN SATURATION: 98 %

## 2024-03-25 DIAGNOSIS — M54.6 ACUTE BILATERAL THORACIC BACK PAIN: Primary | ICD-10-CM

## 2024-03-25 LAB
BACTERIA UR QL AUTO: ABNORMAL /HPF
BILIRUB UR QL STRIP: NEGATIVE
CLARITY UR: ABNORMAL
COD CRY URNS QL: ABNORMAL /HPF
COLOR UR: YELLOW
GLUCOSE UR STRIP-MCNC: NEGATIVE MG/DL
HGB UR QL STRIP.AUTO: ABNORMAL
HYALINE CASTS UR QL AUTO: ABNORMAL /LPF
KETONES UR QL STRIP: ABNORMAL
LEUKOCYTE ESTERASE UR QL STRIP.AUTO: NEGATIVE
NITRITE UR QL STRIP: NEGATIVE
PH UR STRIP.AUTO: 6 [PH] (ref 5–8)
PROT UR QL STRIP: NEGATIVE
RBC # UR STRIP: ABNORMAL /HPF
REF LAB TEST METHOD: ABNORMAL
SP GR UR STRIP: 1.02 (ref 1–1.03)
SQUAMOUS #/AREA URNS HPF: ABNORMAL /HPF
UROBILINOGEN UR QL STRIP: ABNORMAL
WBC # UR STRIP: ABNORMAL /HPF

## 2024-03-25 PROCEDURE — G0463 HOSPITAL OUTPT CLINIC VISIT: HCPCS

## 2024-03-25 PROCEDURE — 25010000002 KETOROLAC TROMETHAMINE PER 15 MG

## 2024-03-25 PROCEDURE — 81001 URINALYSIS AUTO W/SCOPE: CPT

## 2024-03-25 RX ORDER — KETOROLAC TROMETHAMINE 30 MG/ML
30 INJECTION, SOLUTION INTRAMUSCULAR; INTRAVENOUS ONCE
Status: COMPLETED | OUTPATIENT
Start: 2024-03-25 | End: 2024-03-25

## 2024-03-25 RX ORDER — LIDOCAINE 4 G/G
2 PATCH TOPICAL ONCE
Status: DISCONTINUED | OUTPATIENT
Start: 2024-03-25 | End: 2024-03-25

## 2024-03-25 RX ORDER — ACETAMINOPHEN 500 MG
1000 TABLET ORAL ONCE
Status: COMPLETED | OUTPATIENT
Start: 2024-03-25 | End: 2024-03-25

## 2024-03-25 RX ORDER — IBUPROFEN 600 MG/1
600 TABLET ORAL EVERY 8 HOURS PRN
Qty: 15 TABLET | Refills: 0 | Status: SHIPPED | OUTPATIENT
Start: 2024-03-25

## 2024-03-25 RX ORDER — LIDOCAINE 50 MG/G
1 PATCH TOPICAL EVERY 24 HOURS
Qty: 15 PATCH | Refills: 0 | Status: SHIPPED | OUTPATIENT
Start: 2024-03-25

## 2024-03-25 RX ADMIN — ACETAMINOPHEN 1000 MG: 500 TABLET, FILM COATED ORAL at 13:38

## 2024-03-25 RX ADMIN — KETOROLAC TROMETHAMINE 30 MG: 30 INJECTION, SOLUTION INTRAMUSCULAR at 14:30

## 2024-03-25 RX ADMIN — LIDOCAINE 2 PATCH: 4 PATCH TOPICAL at 14:32

## 2024-03-25 NOTE — FSED PROVIDER NOTE
[facility]    EMERGENCY DEPARTMENT ENCOUNTER    Room Number:  08/08  Date seen:  3/25/2024  Time seen: 13:29 EDT  PCP: Christiano Jorgensen MD  Historian:     HPI:  Chief complaint: Back pain  Co Noxt:Jayne Lagunas is a 23 y.o. female who presents to the ED with c/o back pain.  Patient reports that she woke up today with bilateral mid back pain.  She denies any known injury to the area.  Reports that she is currently on an antibiotic for strep throat and was concerned that may be causing her pain.  Patient denies any radiation of the pain.  Reports that the pain is sharp.  She denies any urinary symptoms, fever, nausea vomiting diarrhea, abdominal pain.  Patient is nontoxic in appearance.  Reports that she has not taken anything for the pain.    Timing: Constant  Duration: Today  Location: Thoracic back  Radiation: Nonradiating  Quality: Sharp, aching  Intensity/Severity: Mild to moderate  Associated Symptoms: Back pain  Aggravating Factors: Movement  Alleviating Factors: No attempted home treatment      MEDICAL RECORD REVIEW  Depression    ALLERGIES  Patient has no known allergies.    PAST MEDICAL HISTORY  Active Ambulatory Problems     Diagnosis Date Noted    No Active Ambulatory Problems     Resolved Ambulatory Problems     Diagnosis Date Noted    No Resolved Ambulatory Problems     Past Medical History:   Diagnosis Date    Depression        PAST SURGICAL HISTORY  History reviewed. No pertinent surgical history.    FAMILY HISTORY  History reviewed. No pertinent family history.    SOCIAL HISTORY  Social History     Socioeconomic History    Marital status: Single   Tobacco Use    Smoking status: Never   Substance and Sexual Activity    Alcohol use: Never    Drug use: Yes     Types: Marijuana       REVIEW OF SYSTEMS  Review of Systems    All systems reviewed and negative except for those discussed in HPI.     PHYSICAL EXAM    I have reviewed the triage vital signs  and nursing notes.    ED Triage Vitals [03/25/24 1317]   Temp Heart Rate Resp BP SpO2   98.2 °F (36.8 °C) 70 17 107/70 98 %      Temp src Heart Rate Source Patient Position BP Location FiO2 (%)   Oral Monitor Sitting Right arm --       Physical Exam  Constitutional:       Appearance: Normal appearance. She is not toxic-appearing.   HENT:      Nose: Nose normal.      Mouth/Throat:      Mouth: Mucous membranes are moist.   Cardiovascular:      Rate and Rhythm: Normal rate and regular rhythm.      Pulses: Normal pulses.      Heart sounds: Normal heart sounds.   Pulmonary:      Effort: Pulmonary effort is normal.      Breath sounds: Normal breath sounds.   Musculoskeletal:      Cervical back: Normal range of motion. No swelling, deformity, tenderness or bony tenderness. No pain with movement. Normal range of motion.      Thoracic back: Tenderness present. No swelling, deformity or bony tenderness. Normal range of motion.      Lumbar back: No swelling, deformity, tenderness or bony tenderness. Normal range of motion.        Back:    Skin:     General: Skin is warm.   Neurological:      General: No focal deficit present.      Mental Status: She is alert.   Psychiatric:         Mood and Affect: Mood normal.         Behavior: Behavior normal.         Vital signs and nursing notes reviewed.        LAB RESULTS  Recent Results (from the past 24 hour(s))   Urinalysis With Culture If Indicated - Urine, Clean Catch    Collection Time: 03/25/24  1:39 PM    Specimen: Urine, Clean Catch   Result Value Ref Range    Color, UA Yellow Yellow, Straw    Appearance, UA Slightly Cloudy (A) Clear    pH, UA 6.0 5.0 - 8.0    Specific Gravity, UA 1.025 1.005 - 1.030    Glucose, UA Negative Negative    Ketones, UA Trace (A) Negative    Bilirubin, UA Negative Negative    Blood, UA Large (3+) (A) Negative    Protein, UA Negative Negative    Leuk Esterase, UA Negative Negative    Nitrite, UA Negative Negative    Urobilinogen, UA 0.2 E.U./dL 0.2 -  1.0 E.U./dL       Ordered the above labs and independently reviewed the results.      RADIOLOGY RESULTS  No Radiology Exams Resulted Within Past 24 Hours       I ordered the above noted radiological studies. Independently reviewed by me and discussed with radiologist.  See dictation above for official radiology interpretation.      Orders placed during this visit:  Orders Placed This Encounter   Procedures    Urinalysis With Culture If Indicated - Urine, Clean Catch    Urinalysis, Microscopic Only - Urine, Clean Catch    [place] Urine Culture Tube -           PROCEDURES    Procedures        MEDICATIONS GIVEN IN ER    Medications   ketorolac (TORADOL) injection 30 mg (has no administration in time range)   Lidocaine 4 % 2 patch (has no administration in time range)   acetaminophen (TYLENOL) tablet 1,000 mg (1,000 mg Oral Given 3/25/24 1338)         PROGRESS, DATA ANALYSIS, CONSULTS, AND MEDICAL DECISION MAKING    All labs have been independently reviewed by me.  All radiology studies have been reviewed by me.   EKG's independently reviewed by me.  Discussion below represents my analysis of pertinent findings related to patient's condition, differential diagnosis, treatment plan and final disposition.    I rechecked the patient.  I discussed the patient's labs, radiology findings (including all incidental findings), diagnosis, and plan for discharge.  A repeat exam reveals no new worrisome changes from my initial exam findings.  The patient understands that the fact that they are being discharged does not denote that nothing is abnormal, it indicates that no clinical emergency is present and that they must follow-up as directed in order to properly maintain their health.  Follow-up instructions (specifically listed below) and return to ER precautions were given at this time.  I specifically instructed the patient to follow-up with their PCP.  The patient understands and agrees with the plan, and is ready for  discharge.  All questions answered.         AS OF 14:28 EDT VITALS:    BP - 107/70  HR - 70  TEMP - 98.2 °F (36.8 °C) (Oral)  02 SATS - 98%    Medical Decision Making  MEDICAL DECISION  Lab interpretation:  Labs viewed by me significant for, hematuria noted but patient is on menstrual cycle    Patient is a 23-year-old female who presents today with bilateral thoracic back pain.  Patient denies any injury to the area.  Patient is tender upon palpation to her thoracic muscles. No back pain red flags on history or physical. Presentation not consistent with malignancy (lack of history of malignancy, lack of B symptoms), fracture (no trauma, no bony tenderness to palpation), transverse myelitis, (no sensory loss, no distal weakness), thoracic aortic dissection (equal peripheral pulses, no tachycardia, story does not fit), pneumonia (afebrile, no infectious symptoms), pulmonary embolism (Well's low risk), osteomyelitis or epidural abscess (no IVDU, vertebral tenderness). Given the clinical picture, no indication for imaging at this time.  Patient was given Tylenol, Toradol and lidocaine patch.  I will send her home with a prescription for ibuprofen and Lidoderm.  I recommend that she follow-up with her primary care provider.  Patient was given return precautions with understanding.  Patient was provided a work note.    Problems Addressed:  Acute bilateral thoracic back pain: complicated acute illness or injury    Risk  OTC drugs.  Prescription drug management.          DIAGNOSIS  Final diagnoses:   Acute bilateral thoracic back pain       New Medications Ordered This Visit   Medications    acetaminophen (TYLENOL) tablet 1,000 mg    ketorolac (TORADOL) injection 30 mg    Lidocaine 4 % 2 patch    ibuprofen (ADVIL,MOTRIN) 600 MG tablet     Sig: Take 1 tablet by mouth Every 8 (Eight) Hours As Needed for Mild Pain.     Dispense:  15 tablet     Refill:  0    lidocaine (LIDODERM) 5 %     Sig: Place 1 patch on the skin as  directed by provider Daily. Remove & Discard patch within 12 hours or as directed by MD     Dispense:  15 patch     Refill:  0           I performed hand hygiene on entry into the pt room and upon exit.      Part of this note may be an electronic transcription/translation of spoken language to printed text using the Dragon Dictation System.     Appropriate PPE worn during exam.    Dictated utilizing Dragon dictation     Note Disclaimer: At [facility], we believe that sharing information builds trust and better relationships. You are receiving this note because you recently visited [facility]. It is possible you will see health information before a provider has talked with you about it. This kind of information can be easy to misunderstand. To help you fully understand what it means for your health, we urge you to discuss this note with your provider.

## 2024-03-25 NOTE — Clinical Note
[facility] FSED [place] [id number] [place] [address] [place] [address]  Phone: [phone]    Jayne Lagunas was seen and treated in our emergency department on 3/25/2024.  She may return to work on 03/27/2024.         Thank you for choosing [facility].    Carlie Blakely APRN

## 2024-03-25 NOTE — DISCHARGE INSTRUCTIONS
Thank you for letting us care for you today.  You can take Tylenol and ibuprofen as needed.  Use the lidocaine patches to apply to the sore area for 12 hours at a time.  Remove and discard.  He can use ice or heat to the sore area for 20 minutes at a time.  Do not apply heat over the lidocaine patch as it can cause a burn.  Please follow-up with your primary care provider for continued evaluation.  Return to the emergency room for any worsening pain, vomiting, fever or any other concerning symptoms.

## 2024-04-04 ENCOUNTER — APPOINTMENT (OUTPATIENT)
Dept: GENERAL RADIOLOGY | Facility: HOSPITAL | Age: 24
End: 2024-04-04
Payer: MEDICAID

## 2024-04-04 ENCOUNTER — HOSPITAL ENCOUNTER (EMERGENCY)
Facility: HOSPITAL | Age: 24
Discharge: HOME | End: 2024-04-04
Attending: EMERGENCY MEDICINE
Payer: MEDICAID

## 2024-04-04 VITALS
HEIGHT: 63 IN | DIASTOLIC BLOOD PRESSURE: 75 MMHG | HEART RATE: 90 BPM | SYSTOLIC BLOOD PRESSURE: 117 MMHG | WEIGHT: 124.6 LBS | TEMPERATURE: 97.7 F | OXYGEN SATURATION: 97 % | BODY MASS INDEX: 22.08 KG/M2 | RESPIRATION RATE: 18 BRPM

## 2024-04-04 DIAGNOSIS — R09.1 PLEURISY: Primary | ICD-10-CM

## 2024-04-04 DIAGNOSIS — J06.9 VIRAL UPPER RESPIRATORY TRACT INFECTION WITH COUGH: ICD-10-CM

## 2024-04-04 LAB
ALBUMIN SERPL-MCNC: 4.2 G/DL (ref 3.5–5.2)
ALBUMIN/GLOB SERPL: 2.2 G/DL
ALP SERPL-CCNC: 49 U/L (ref 39–117)
ALT SERPL W P-5'-P-CCNC: 9 U/L (ref 1–33)
ANION GAP SERPL CALCULATED.3IONS-SCNC: 7.7 MMOL/L (ref 5–15)
AST SERPL-CCNC: 11 U/L (ref 1–32)
B-HCG UR QL: NEGATIVE
BASOPHILS # BLD AUTO: 0.02 10*3/MM3 (ref 0–0.2)
BASOPHILS NFR BLD AUTO: 0.4 % (ref 0–1.5)
BILIRUB SERPL-MCNC: 0.4 MG/DL (ref 0–1.2)
BUN SERPL-MCNC: 15 MG/DL (ref 6–20)
BUN/CREAT SERPL: 17.6 (ref 7–25)
CALCIUM SPEC-SCNC: 9.1 MG/DL (ref 8.6–10.5)
CHLORIDE SERPL-SCNC: 103 MMOL/L (ref 98–107)
CO2 SERPL-SCNC: 28.3 MMOL/L (ref 22–29)
CREAT SERPL-MCNC: 0.85 MG/DL (ref 0.57–1)
DEPRECATED RDW RBC AUTO: 39.1 FL (ref 37–54)
EGFRCR SERPLBLD CKD-EPI 2021: 97.6 ML/MIN/1.73
EOSINOPHIL # BLD AUTO: 0.04 10*3/MM3 (ref 0–0.4)
EOSINOPHIL NFR BLD AUTO: 0.9 % (ref 0.3–6.2)
ERYTHROCYTE [DISTWIDTH] IN BLOOD BY AUTOMATED COUNT: 11.4 % (ref 12.3–15.4)
FLUAV SUBTYP SPEC NAA+PROBE: NOT DETECTED
FLUBV RNA ISLT QL NAA+PROBE: NOT DETECTED
GLOBULIN UR ELPH-MCNC: 1.9 GM/DL
GLUCOSE SERPL-MCNC: 82 MG/DL (ref 65–99)
HCT VFR BLD AUTO: 40.5 % (ref 34–46.6)
HGB BLD-MCNC: 13.7 G/DL (ref 12–15.9)
IMM GRANULOCYTES # BLD AUTO: 0.01 10*3/MM3 (ref 0–0.05)
IMM GRANULOCYTES NFR BLD AUTO: 0.2 % (ref 0–0.5)
LYMPHOCYTES # BLD AUTO: 1.18 10*3/MM3 (ref 0.7–3.1)
LYMPHOCYTES NFR BLD AUTO: 25.4 % (ref 19.6–45.3)
MCH RBC QN AUTO: 31 PG (ref 26.6–33)
MCHC RBC AUTO-ENTMCNC: 33.8 G/DL (ref 31.5–35.7)
MCV RBC AUTO: 91.6 FL (ref 79–97)
MONOCYTES # BLD AUTO: 0.33 10*3/MM3 (ref 0.1–0.9)
MONOCYTES NFR BLD AUTO: 7.1 % (ref 5–12)
NEUTROPHILS NFR BLD AUTO: 3.06 10*3/MM3 (ref 1.7–7)
NEUTROPHILS NFR BLD AUTO: 66 % (ref 42.7–76)
PLATELET # BLD AUTO: 284 10*3/MM3 (ref 140–450)
PMV BLD AUTO: 8.7 FL (ref 6–12)
POTASSIUM SERPL-SCNC: 3.9 MMOL/L (ref 3.5–5.2)
PROT SERPL-MCNC: 6.1 G/DL (ref 6–8.5)
QT INTERVAL: 367 MS
QTC INTERVAL: 453 MS
RBC # BLD AUTO: 4.42 10*6/MM3 (ref 3.77–5.28)
SARS-COV-2 RNA RESP QL NAA+PROBE: NOT DETECTED
SODIUM SERPL-SCNC: 139 MMOL/L (ref 136–145)
TROPONIN T SERPL HS-MCNC: <6 NG/L
WBC NRBC COR # BLD AUTO: 4.64 10*3/MM3 (ref 3.4–10.8)

## 2024-04-04 PROCEDURE — 81025 URINE PREGNANCY TEST: CPT

## 2024-04-04 PROCEDURE — 93005 ELECTROCARDIOGRAM TRACING: CPT | Performed by: EMERGENCY MEDICINE

## 2024-04-04 PROCEDURE — 96374 THER/PROPH/DIAG INJ IV PUSH: CPT

## 2024-04-04 PROCEDURE — 93010 ELECTROCARDIOGRAM REPORT: CPT | Performed by: EMERGENCY MEDICINE

## 2024-04-04 PROCEDURE — 71046 X-RAY EXAM CHEST 2 VIEWS: CPT

## 2024-04-04 PROCEDURE — 99284 EMERGENCY DEPT VISIT MOD MDM: CPT

## 2024-04-04 PROCEDURE — 85025 COMPLETE CBC W/AUTO DIFF WBC: CPT | Performed by: NURSE PRACTITIONER

## 2024-04-04 PROCEDURE — 84484 ASSAY OF TROPONIN QUANT: CPT | Performed by: NURSE PRACTITIONER

## 2024-04-04 PROCEDURE — 99284 EMERGENCY DEPT VISIT MOD MDM: CPT | Performed by: NURSE PRACTITIONER

## 2024-04-04 PROCEDURE — 87636 SARSCOV2 & INF A&B AMP PRB: CPT | Performed by: NURSE PRACTITIONER

## 2024-04-04 PROCEDURE — 80053 COMPREHEN METABOLIC PANEL: CPT | Performed by: NURSE PRACTITIONER

## 2024-04-04 PROCEDURE — 25010000002 KETOROLAC TROMETHAMINE PER 15 MG: Performed by: NURSE PRACTITIONER

## 2024-04-04 RX ORDER — SPIRONOLACTONE 50 MG/1
50 TABLET, FILM COATED ORAL DAILY
COMMUNITY

## 2024-04-04 RX ORDER — ALPRAZOLAM 0.25 MG/1
1 TABLET ORAL 3 TIMES DAILY
COMMUNITY

## 2024-04-04 RX ORDER — KETOROLAC TROMETHAMINE 30 MG/ML
30 INJECTION, SOLUTION INTRAMUSCULAR; INTRAVENOUS ONCE
Status: COMPLETED | OUTPATIENT
Start: 2024-04-04 | End: 2024-04-04

## 2024-04-04 RX ORDER — PREDNISONE 20 MG/1
20 TABLET ORAL 2 TIMES DAILY
Qty: 14 TABLET | Refills: 0 | Status: SHIPPED | OUTPATIENT
Start: 2024-04-04 | End: 2024-04-11

## 2024-04-04 RX ADMIN — KETOROLAC TROMETHAMINE 30 MG: 30 INJECTION, SOLUTION INTRAMUSCULAR at 13:39

## 2024-04-04 NOTE — ED NOTES
Pt presents with c/o CP x 3-4 days. Pt reports CP worse this morning, reports a sharp R sided pain. Pt states has also had a cough x 1 week. Pt reports has had similar episode of CP in the past but denies seeking medical attention at that time. Pt placed on telemetry

## 2024-04-04 NOTE — FSED PROVIDER NOTE
Subjective   History of Present Illness  The patient is a 23-year-old female who presents to the ER with chest pain that has gotten worse over the past 1 to 2 days.  Patient reports has been coughing for approximately a week.  Patient denies any fevers, nausea, vomiting or shortness of breath.    History provided by:  Patient   used: No        Review of Systems   Respiratory:  Positive for cough.    Cardiovascular:  Positive for chest pain.       Past Medical History:   Diagnosis Date    Depression     PCOS (polycystic ovarian syndrome)        No Known Allergies    History reviewed. No pertinent surgical history.    History reviewed. No pertinent family history.    Social History     Socioeconomic History    Marital status: Single   Tobacco Use    Smoking status: Never   Substance and Sexual Activity    Alcohol use: Never    Drug use: Yes     Types: Marijuana           Objective   Physical Exam  Vitals and nursing note reviewed.   Constitutional:       Appearance: Normal appearance. She is well-developed.   HENT:      Head: Normocephalic.      Right Ear: Tympanic membrane, ear canal and external ear normal.      Left Ear: Tympanic membrane, ear canal and external ear normal.      Nose: Nose normal.      Mouth/Throat:      Lips: Pink.      Mouth: Mucous membranes are moist.      Pharynx: Oropharynx is clear. Uvula midline.   Eyes:      Extraocular Movements: Extraocular movements intact.      Pupils: Pupils are equal, round, and reactive to light.   Cardiovascular:      Rate and Rhythm: Normal rate and regular rhythm.      Pulses: Normal pulses.      Heart sounds: Normal heart sounds.   Pulmonary:      Effort: Pulmonary effort is normal.      Breath sounds: Normal breath sounds and air entry.   Abdominal:      Palpations: Abdomen is soft.   Musculoskeletal:         General: Normal range of motion.      Cervical back: Full passive range of motion without pain, normal range of motion and neck  supple.   Skin:     General: Skin is warm and dry.   Neurological:      General: No focal deficit present.      Mental Status: She is alert and oriented to person, place, and time.   Psychiatric:         Mood and Affect: Mood normal.         Behavior: Behavior normal. Behavior is cooperative.         Procedures           ED Course  ED Course as of 04/04/24 1502   Thu Apr 04, 2024   1205 XR CHEST 2 VW     Date of Exam: 4/4/2024 11:42 AM EDT     Indication: cough x 1     Comparison: 9/26/2023.     Findings:  Heart and pulmonary vessels and mediastinal contours appear within normal limits. Lung fields are well inflated and clear. There is no pneumothorax. There is no effusion. Bones appear normal.     IMPRESSION:  Impression:  Negative.   [DS]   1205 HCG, Urine QL: Negative [DS]   1229 EKG shows normal sinus rhythm, no ST or T wave changes noted.  Heart rate 91.    Review provide Dr. Ramesh [DS]   1259 WBC: 4.64 [DS]   1259 Hemoglobin: 13.7 [DS]   1300 Hematocrit: 40.5 [DS]   1335 COVID19: Not Detected [DS]   1335 Influenza A PCR: Not Detected [DS]   1335 Influenza B PCR: Not Detected [DS]   1335 HS Troponin T: <6 [DS]   1335 Glucose: 82 [DS]   1335 BUN: 15 [DS]   1335 Creatinine: 0.85 [DS]   1335 Potassium: 3.9 [DS]   1458 Patient reports Toradol did help with her pain. [DS]      ED Course User Index  [DS] Tabatha Santana, LEXI                                           Medical Decision Making  The patient is a 23-year-old female who presents to the ER with chest pain that has gotten worse over the past 1 to 2 days.  Patient reports has been coughing for approximately a week, reports coughing, taking a deep breath, movement makes pain worse.  Patient denies any fevers, nausea, vomiting or shortness of breath.      Exam without evidence of volume overload so doubt heart failure. EKG without signs of active ischemia. Given the timing of pain to ER presentation, single troponin is negative so doubt NSTEMI.  Presentation not consistent with acute PE (PERC negative), pneumothorax (not visualized on chest xr), thoracic aortic dissection, pericarditis, tamponade, pneumonia (no infectious symptoms, clear chest xr), myocarditis (no recent illness, neg trop). HEART score:0 so plan to admit patient for risk stratification_; discharge patient home with PMD follow up__.    Problems Addressed:  Pleurisy: complicated acute illness or injury  Viral upper respiratory tract infection with cough: complicated acute illness or injury    Amount and/or Complexity of Data Reviewed  Labs: ordered. Decision-making details documented in ED Course.  Radiology: ordered.  ECG/medicine tests: ordered.    Risk  Prescription drug management.        Final diagnoses:   Pleurisy   Viral upper respiratory tract infection with cough       ED Disposition  ED Disposition       ED Disposition   Discharge    Condition   Stable    Comment   --               Christiano Jorgensen MD  [address]  [address] [place] [address] [phone]    Schedule an appointment as soon as possible for a visit in 1 week  Call for follow-up appointment.         Medication List        New Prescriptions      predniSONE 20 MG tablet  Commonly known as: DELTASONE  Take 1 tablet by mouth 2 (Two) Times a Day for 7 days.               Where to Get Your Medications        These medications were sent to [facility] #[id number] - [place], [place] - [address] [place] [address] AT [address] [place] [address] - [phone] [facility] [phone] [address], [place] [address]      Phone: [phone]   predniSONE 20 MG tablet

## 2024-04-04 NOTE — Clinical Note
[facility] FSED [place] [address] [place] [address] [place] [address]  Phone: [phone]    Jayne Lagunas was seen and treated in our emergency department on 4/4/2024.  She may return to work on 04/05/2024.         Thank you for choosing [facility].    Tabatha Santana APRN

## 2024-07-25 ENCOUNTER — HOSPITAL ENCOUNTER (OUTPATIENT)
Facility: HOSPITAL | Age: 24
Discharge: HOME | End: 2024-07-25
Attending: EMERGENCY MEDICINE | Admitting: EMERGENCY MEDICINE
Payer: MEDICAID

## 2024-07-25 VITALS
TEMPERATURE: 98.2 F | HEIGHT: 62 IN | HEART RATE: 98 BPM | DIASTOLIC BLOOD PRESSURE: 78 MMHG | OXYGEN SATURATION: 99 % | RESPIRATION RATE: 18 BRPM | SYSTOLIC BLOOD PRESSURE: 106 MMHG | BODY MASS INDEX: 22.58 KG/M2 | WEIGHT: 122.7 LBS

## 2024-07-25 DIAGNOSIS — R42 DIZZINESS: Primary | ICD-10-CM

## 2024-07-25 DIAGNOSIS — J02.9 ACUTE VIRAL PHARYNGITIS: ICD-10-CM

## 2024-07-25 LAB
B-HCG UR QL: NEGATIVE
BILIRUB UR QL STRIP: NEGATIVE
CLARITY UR: CLEAR
COLOR UR: YELLOW
FLUAV SUBTYP SPEC NAA+PROBE: NOT DETECTED
FLUBV RNA ISLT QL NAA+PROBE: NOT DETECTED
GLUCOSE BLDC GLUCOMTR-MCNC: 118 MG/DL (ref 70–130)
GLUCOSE UR STRIP-MCNC: NEGATIVE MG/DL
HGB UR QL STRIP.AUTO: ABNORMAL
KETONES UR QL STRIP: NEGATIVE
LEUKOCYTE ESTERASE UR QL STRIP.AUTO: NEGATIVE
NITRITE UR QL STRIP: NEGATIVE
PH UR STRIP.AUTO: 6 [PH] (ref 5–8)
PROT UR QL STRIP: NEGATIVE
SARS-COV-2 RNA RESP QL NAA+PROBE: NOT DETECTED
SP GR UR STRIP: >=1.03 (ref 1–1.03)
STREP A PCR: NOT DETECTED
UROBILINOGEN UR QL STRIP: ABNORMAL

## 2024-07-25 PROCEDURE — 99214 OFFICE O/P EST MOD 30 MIN: CPT | Performed by: EMERGENCY MEDICINE

## 2024-07-25 PROCEDURE — 81025 URINE PREGNANCY TEST: CPT | Performed by: EMERGENCY MEDICINE

## 2024-07-25 PROCEDURE — 82948 REAGENT STRIP/BLOOD GLUCOSE: CPT

## 2024-07-25 PROCEDURE — 87636 SARSCOV2 & INF A&B AMP PRB: CPT | Performed by: EMERGENCY MEDICINE

## 2024-07-25 PROCEDURE — 87651 STREP A DNA AMP PROBE: CPT | Performed by: EMERGENCY MEDICINE

## 2024-07-25 PROCEDURE — G0463 HOSPITAL OUTPT CLINIC VISIT: HCPCS | Performed by: EMERGENCY MEDICINE

## 2024-07-25 PROCEDURE — 81003 URINALYSIS AUTO W/O SCOPE: CPT | Performed by: EMERGENCY MEDICINE

## 2024-07-25 RX ORDER — IBUPROFEN 600 MG/1
600 TABLET ORAL EVERY 8 HOURS PRN
Qty: 15 TABLET | Refills: 0 | Status: SHIPPED | OUTPATIENT
Start: 2024-07-25

## 2024-07-25 NOTE — Clinical Note
[facility] FSED [place] [id number] [place] [address] [place] [address]  Phone: [phone]    Jayne Lagunas was seen and treated in our emergency department on 7/25/2024.  She may return to work on 07/26/2024.         Thank you for choosing [facility].    Victor Manuel Montoya MD

## 2024-07-25 NOTE — FSED PROVIDER NOTE
Subjective   History of Present Illness  Patient with complaint of right side throat pain and dizziness today. Ill contact with family wno had cough and sore throat. No vomiting, abdominal pain, back pain, leg pain or swelling. No rash. No headache, blujrry vision. No other complaints. No rash.    History provided by:  Patient   used: No        Review of Systems   All other systems reviewed and are negative.      Past Medical History:   Diagnosis Date    Depression     PCOS (polycystic ovarian syndrome)        No Known Allergies    History reviewed. No pertinent surgical history.    History reviewed. No pertinent family history.    Social History     Socioeconomic History    Marital status: Single   Tobacco Use    Smoking status: Never   Substance and Sexual Activity    Alcohol use: Never    Drug use: Yes     Types: Marijuana           Objective   Physical Exam  Vitals and nursing note reviewed.   Constitutional:       Appearance: Normal appearance.   HENT:      Nose: Nose normal.      Mouth/Throat:      Mouth: Mucous membranes are moist.      Pharynx: Oropharynx is clear. No posterior oropharyngeal erythema.   Eyes:      Extraocular Movements: Extraocular movements intact.      Conjunctiva/sclera: Conjunctivae normal.      Pupils: Pupils are equal, round, and reactive to light.   Pulmonary:      Effort: Pulmonary effort is normal.      Breath sounds: Normal breath sounds.   Musculoskeletal:         General: Normal range of motion.      Cervical back: Normal range of motion and neck supple.   Skin:     General: Skin is warm and dry.      Capillary Refill: Capillary refill takes less than 2 seconds.   Neurological:      General: No focal deficit present.      Mental Status: She is alert and oriented to person, place, and time.   Psychiatric:         Mood and Affect: Mood normal.         Behavior: Behavior normal.         Procedures           ED Course                                            Medical Decision Making  Concern for pharyngitis, cyesis. D/w patient. Agree withplan.    Problems Addressed:  Acute viral pharyngitis: complicated acute illness or injury  Dizziness: complicated acute illness or injury    Amount and/or Complexity of Data Reviewed  Labs: ordered.    Risk  Prescription drug management.        Final diagnoses:   Dizziness   Acute viral pharyngitis       ED Disposition  ED Disposition       ED Disposition   Discharge    Condition   Stable    Comment   --               Christiano Jorgensen MD  [address]  [address] [place] [address] [phone]    Schedule an appointment as soon as possible for a visit            Medication List        Stop      lidocaine 5 %  Commonly known as: LIDODERM               Where to Get Your Medications        These medications were sent to [email or web address] #[id number] - [place], [place] - [address] [place] [address] - [phone]  - [phone] [address] [place] [address]      Phone: [phone]   ibuprofen 600 MG tablet

## 2025-04-14 ENCOUNTER — HOSPITAL ENCOUNTER (OUTPATIENT)
Facility: HOSPITAL | Age: 25
Discharge: HOME | End: 2025-04-14
Attending: EMERGENCY MEDICINE | Admitting: EMERGENCY MEDICINE
Payer: MEDICAID

## 2025-04-14 VITALS
RESPIRATION RATE: 18 BRPM | TEMPERATURE: 98.7 F | WEIGHT: 128.5 LBS | BODY MASS INDEX: 22.77 KG/M2 | SYSTOLIC BLOOD PRESSURE: 113 MMHG | HEIGHT: 63 IN | HEART RATE: 89 BPM | OXYGEN SATURATION: 100 % | DIASTOLIC BLOOD PRESSURE: 73 MMHG

## 2025-04-14 DIAGNOSIS — A08.4 VIRAL GASTROENTERITIS: Primary | ICD-10-CM

## 2025-04-14 LAB
FLUAV SUBTYP SPEC NAA+PROBE: NOT DETECTED
FLUBV RNA ISLT QL NAA+PROBE: NOT DETECTED
SARS-COV-2 RNA RESP QL NAA+PROBE: NOT DETECTED

## 2025-04-14 PROCEDURE — G0463 HOSPITAL OUTPT CLINIC VISIT: HCPCS

## 2025-04-14 PROCEDURE — 87636 SARSCOV2 & INF A&B AMP PRB: CPT | Performed by: EMERGENCY MEDICINE

## 2025-04-14 PROCEDURE — 25010000002 METOCLOPRAMIDE PER 10 MG

## 2025-04-14 PROCEDURE — 63710000001 ONDANSETRON ODT 4 MG TABLET DISPERSIBLE

## 2025-04-14 RX ORDER — ONDANSETRON 4 MG/1
4 TABLET, ORALLY DISINTEGRATING ORAL ONCE
Status: COMPLETED | OUTPATIENT
Start: 2025-04-14 | End: 2025-04-14

## 2025-04-14 RX ORDER — ONDANSETRON 4 MG/1
4 TABLET, ORALLY DISINTEGRATING ORAL EVERY 8 HOURS PRN
Qty: 21 TABLET | Refills: 0 | Status: SHIPPED | OUTPATIENT
Start: 2025-04-14 | End: 2025-04-21

## 2025-04-14 RX ORDER — METOCLOPRAMIDE HYDROCHLORIDE 5 MG/ML
10 INJECTION INTRAMUSCULAR; INTRAVENOUS ONCE
Status: COMPLETED | OUTPATIENT
Start: 2025-04-14 | End: 2025-04-14

## 2025-04-14 RX ADMIN — METOCLOPRAMIDE 10 MG: 5 INJECTION, SOLUTION INTRAMUSCULAR; INTRAVENOUS at 11:19

## 2025-04-14 RX ADMIN — ONDANSETRON 4 MG: 4 TABLET, ORALLY DISINTEGRATING ORAL at 11:19

## 2025-04-14 NOTE — Clinical Note
[facility] FSED [place] [address] [place] [address] [place] [address]  Phone: [phone]    Jayne Lagunas was seen and treated in our emergency department on 4/14/2025.  She may return to work on 04/17/2025.         Thank you for choosing [facility].    Dereje Magallon Jr., APRN

## 2025-04-14 NOTE — FSED PROVIDER NOTE
Subjective   History of Present Illness  24-year-old female reports onset of vomiting and diarrhea yesterday reports she vomited 5 times and then had diarrhea afterward.  Reports that she went to work [employer] this morning and ate a full meal and then began to feel nauseated and came immediately to this facility.  She reports that she has Zofran at home but that it did not help her yesterday.  She is denying chest pain shortness of breath she denies abdominal pain she reports a vague sense of nausea to her abdomen          Review of Systems   All other systems reviewed and are negative.      Past Medical History:   Diagnosis Date    Depression     PCOS (polycystic ovarian syndrome)        No Known Allergies    History reviewed. No pertinent surgical history.    History reviewed. No pertinent family history.    Social History     Socioeconomic History    Marital status: Single   Tobacco Use    Smoking status: Never   Substance and Sexual Activity    Alcohol use: Never    Drug use: Yes     Types: Marijuana           Objective   Physical Exam  Vitals and nursing note reviewed.   Constitutional:       General: She is not in acute distress.     Appearance: Normal appearance. She is not toxic-appearing.   HENT:      Head: Normocephalic and atraumatic.      Nose: Nose normal.      Mouth/Throat:      Mouth: Mucous membranes are moist.      Pharynx: Oropharynx is clear.   Eyes:      Extraocular Movements: Extraocular movements intact.      Conjunctiva/sclera: Conjunctivae normal.      Pupils: Pupils are equal, round, and reactive to light.   Cardiovascular:      Rate and Rhythm: Normal rate.      Pulses: Normal pulses.      Heart sounds: Normal heart sounds.   Pulmonary:      Effort: Pulmonary effort is normal. No respiratory distress.      Breath sounds: Normal breath sounds.   Abdominal:      General: Abdomen is flat. Bowel sounds are normal.      Palpations: Abdomen is soft.   Musculoskeletal:         General: Normal  range of motion.      Cervical back: Normal range of motion and neck supple.   Skin:     General: Skin is warm.      Capillary Refill: Capillary refill takes less than 2 seconds.   Neurological:      General: No focal deficit present.      Mental Status: She is alert and oriented to person, place, and time. Mental status is at baseline.         Procedures           ED Course  ED Course as of 04/14/25 1151   Mon Apr 14, 2025   1130 COVID and influenza not detected [WF]   1139 COVID influenza not detected [WF]      ED Course User Index  [WF] Dereje Magallon Jr., APRN                                           Medical Decision Making  I suspect viral gastroenteritis I advised patient that she should not have eaten [employer] this morning after vomiting history.    Patient has passed the p.o. fluid challenge she received Reglan and Zofran.  She is agreeable to discharge home with Zofran    Problems Addressed:  Viral gastroenteritis: complicated acute illness or injury    Risk  Prescription drug management.        Final diagnoses:   Viral gastroenteritis       ED Disposition  ED Disposition       ED Disposition   Discharge    Condition   Stable    Comment   --               Christiano Jorgensen MD  [address] [place] [address] [place] [address] [phone]               Medication List        New Prescriptions      ondansetron ODT 4 MG disintegrating tablet  Commonly known as: ZOFRAN-ODT  Place 1 tablet on the tongue Every 8 (Eight) Hours As Needed for Vomiting or Nausea for up to 7 days.               Where to Get Your Medications        These medications were sent to [facility] #[id number] - [place], [place] - [address] [place] [address] AT [address] [place] [address] - [phone] [facility] [phone] [address], [place] [address]      Phone: [phone]   ondansetron ODT 4 MG disintegrating tablet

## 2025-04-14 NOTE — DISCHARGE INSTRUCTIONS
Take Tylenol or ibuprofen as needed for body aches or fever    Take Zofran as needed for nausea    Clear liquids ginger ale Sprite Pedialyte gently advance your diet as tolerated    Follow-up with family doctor as needed return to ER for worsening symptoms